# Patient Record
Sex: MALE | Race: WHITE | NOT HISPANIC OR LATINO | URBAN - METROPOLITAN AREA
[De-identification: names, ages, dates, MRNs, and addresses within clinical notes are randomized per-mention and may not be internally consistent; named-entity substitution may affect disease eponyms.]

---

## 2018-07-03 ENCOUNTER — EMERGENCY (EMERGENCY)
Facility: HOSPITAL | Age: 33
LOS: 1 days | Discharge: HOME | End: 2018-07-03
Attending: EMERGENCY MEDICINE
Payer: COMMERCIAL

## 2018-07-03 VITALS
SYSTOLIC BLOOD PRESSURE: 127 MMHG | HEART RATE: 72 BPM | OXYGEN SATURATION: 99 % | RESPIRATION RATE: 18 BRPM | DIASTOLIC BLOOD PRESSURE: 71 MMHG | TEMPERATURE: 98 F

## 2018-07-03 VITALS
SYSTOLIC BLOOD PRESSURE: 132 MMHG | DIASTOLIC BLOOD PRESSURE: 93 MMHG | RESPIRATION RATE: 18 BRPM | TEMPERATURE: 98 F | HEART RATE: 74 BPM

## 2018-07-03 PROCEDURE — 99282 EMERGENCY DEPT VISIT SF MDM: CPT

## 2018-07-03 PROCEDURE — 99283 EMERGENCY DEPT VISIT LOW MDM: CPT

## 2018-07-03 RX ORDER — CEPHALEXIN 500 MG
500 CAPSULE ORAL
Qty: 0 | Refills: 0 | Status: DISCONTINUED | OUTPATIENT
Start: 2018-07-03 | End: 2018-07-03

## 2018-07-03 RX ORDER — PHENAZOPYRIDINE HCL 100 MG
100 TABLET ORAL ONCE
Qty: 0 | Refills: 0 | Status: DISCONTINUED | OUTPATIENT
Start: 2018-07-03 | End: 2018-07-03

## 2018-07-03 NOTE — ED ADULT NURSE NOTE - OBJECTIVE STATEMENT
32 yo male pt presents to ed complaining of right arm numbness for two days. as per pt he woke up two days ago and noticed he had decreased strength to his right hand and wrist, as well as numbness on his right forearm. pt states he can't move his wrist due to weakness. pt denies any medical history. full rom in shoulder and elbow. pt's family states they are concerned because hw was slurring his speech this morning and there is a family history of aneurysm. family also states he was slurring his speech because he took his clonipin. pt denies tingling/headache/n/v/dizziness/blurry vision/fevers/chills/diaphoresis/chest pain/sob. cap refill brisk. radial pulses strong and equal bilaterally. skin warm dry and intact. pt denies falls/trauma.

## 2018-07-03 NOTE — ED PROVIDER NOTE - ATTENDING CONTRIBUTION TO CARE
pt is a 32 y/o male with r arm weakness and numbness with wrist drop noted for 2 days, with neuro intact prox with wrist drop noted, neuropraxia to radial n distrubution, outpt neuro follow up.

## 2018-07-03 NOTE — ED ADULT NURSE NOTE - CHPI ED SYMPTOMS NEG
no pain/no nausea/no fever/no decreased eating/drinking/no vomiting/no dizziness/no chills/no tingling

## 2018-07-03 NOTE — ED PROVIDER NOTE - PHYSICAL EXAMINATION
Weakness at wrist: unable to flex wrist.   Decreased  strength  Can move all fingers of right hand but decreased versus left hand   Decreased ability to supinate right forearm  Decreased sensation to right dorsal forearm, right thumb   Bicep/Tricep/Deltoid strength intact bilaterally  No focal cranial neuro deficits  Sensation intact to left arm  Small bruise to lateral aspect of right upper arm   no neck stiffness

## 2018-07-03 NOTE — ED PROVIDER NOTE - OBJECTIVE STATEMENT
33M presents with chief complaint of right arm weakness/numbness. Per patient, woke up Sunday morning and noticed that he had decreased strength to his right wrist/hand, as well as numbness to his dorsal forearm and thumb. Patient states that he cannot move his wrist (unable to flex wrist) due to weakness. No prior hx of similar issue. Of note, family states they are concerned b/c he was slurring speech this AM. Patient states that he took clonopin this AM and therefore was slurring speech. Denies any fevers or chills, nausea or vomiting, diarrhea or constipation, headache, dizziness, blurry vision. No weakness to any other extremity. No neck or back pain. Denies any drug use other than marijuana daily. Denies any recent heavy alcohol use. Smokes daily.

## 2020-07-14 ENCOUNTER — HOSPITAL ENCOUNTER (INPATIENT)
Dept: HOSPITAL 74 - YASAS | Age: 35
LOS: 5 days | Discharge: HOME | DRG: 773 | End: 2020-07-19
Attending: ALLERGY & IMMUNOLOGY | Admitting: ALLERGY & IMMUNOLOGY
Payer: COMMERCIAL

## 2020-07-14 VITALS — BODY MASS INDEX: 23.6 KG/M2

## 2020-07-14 DIAGNOSIS — F17.210: ICD-10-CM

## 2020-07-14 DIAGNOSIS — F19.24: ICD-10-CM

## 2020-07-14 DIAGNOSIS — F41.1: ICD-10-CM
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
## 2020-07-14 DIAGNOSIS — F12.20: ICD-10-CM

## 2020-07-14 DIAGNOSIS — F11.23: Primary | ICD-10-CM

## 2020-07-14 DIAGNOSIS — T40.1X1A: ICD-10-CM

## 2020-07-14 DIAGNOSIS — Z90.81: ICD-10-CM

## 2020-07-14 DIAGNOSIS — F19.280: ICD-10-CM

## 2020-07-14 DIAGNOSIS — F19.282: ICD-10-CM

## 2020-07-14 LAB
ALBUMIN SERPL-MCNC: 4.4 G/DL (ref 3.4–5)
ALP SERPL-CCNC: 52 U/L (ref 45–117)
ALT SERPL-CCNC: 18 U/L (ref 13–61)
ANION GAP SERPL CALC-SCNC: 9 MMOL/L (ref 8–16)
AST SERPL-CCNC: 9 U/L (ref 15–37)
BILIRUB SERPL-MCNC: 0.2 MG/DL (ref 0.2–1)
BUN SERPL-MCNC: 16.7 MG/DL (ref 7–18)
CALCIUM SERPL-MCNC: 9.2 MG/DL (ref 8.5–10.1)
CHLORIDE SERPL-SCNC: 102 MMOL/L (ref 98–107)
CO2 SERPL-SCNC: 28 MMOL/L (ref 21–32)
CREAT SERPL-MCNC: 1 MG/DL (ref 0.55–1.3)
DEPRECATED RDW RBC AUTO: 13.8 % (ref 11.9–15.9)
GLUCOSE SERPL-MCNC: 90 MG/DL (ref 74–106)
HCT VFR BLD CALC: 41.4 % (ref 35.4–49)
HGB BLD-MCNC: 14 GM/DL (ref 11.7–16.9)
MCH RBC QN AUTO: 31 PG (ref 25.7–33.7)
MCHC RBC AUTO-ENTMCNC: 33.9 G/DL (ref 32–35.9)
MCV RBC: 91.6 FL (ref 80–96)
PLATELET # BLD AUTO: 273 K/MM3 (ref 134–434)
PMV BLD: 8.5 FL (ref 7.5–11.1)
POTASSIUM SERPLBLD-SCNC: 3.3 MMOL/L (ref 3.5–5.1)
PROT SERPL-MCNC: 7.4 G/DL (ref 6.4–8.2)
RBC # BLD AUTO: 4.52 M/MM3 (ref 4–5.6)
SODIUM SERPL-SCNC: 138 MMOL/L (ref 136–145)
WBC # BLD AUTO: 6.5 K/MM3 (ref 4–10)

## 2020-07-14 PROCEDURE — HZ2ZZZZ DETOXIFICATION SERVICES FOR SUBSTANCE ABUSE TREATMENT: ICD-10-PCS | Performed by: ALLERGY & IMMUNOLOGY

## 2020-07-14 PROCEDURE — U0003 INFECTIOUS AGENT DETECTION BY NUCLEIC ACID (DNA OR RNA); SEVERE ACUTE RESPIRATORY SYNDROME CORONAVIRUS 2 (SARS-COV-2) (CORONAVIRUS DISEASE [COVID-19]), AMPLIFIED PROBE TECHNIQUE, MAKING USE OF HIGH THROUGHPUT TECHNOLOGIES AS DESCRIBED BY CMS-2020-01-R: HCPCS

## 2020-07-14 RX ADMIN — NICOTINE POLACRILEX PRN MG: 2 GUM, CHEWING ORAL at 18:09

## 2020-07-14 RX ADMIN — HYDROXYZINE PAMOATE SCH: 25 CAPSULE ORAL at 18:45

## 2020-07-14 RX ADMIN — Medication SCH MG: at 22:14

## 2020-07-14 RX ADMIN — SUVOREXANT PRN MG: 10 TABLET, FILM COATED ORAL at 22:14

## 2020-07-14 RX ADMIN — Medication SCH MG: at 22:13

## 2020-07-14 RX ADMIN — NICOTINE POLACRILEX PRN MG: 2 GUM, CHEWING ORAL at 21:06

## 2020-07-14 RX ADMIN — HYDROXYZINE PAMOATE SCH MG: 25 CAPSULE ORAL at 13:49

## 2020-07-14 RX ADMIN — HYDROXYZINE PAMOATE SCH MG: 25 CAPSULE ORAL at 22:14

## 2020-07-14 RX ADMIN — NICOTINE SCH: 21 PATCH TRANSDERMAL at 14:01
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
## 2020-07-14 NOTE — BHS.RME
Substance Use & Tx History





- Substance Use History


  ** Heroin


Substance amount:  5 bags


Frequency of use: Daily


Substance route: Inhalation (ex: sniffing or snorting)


Date of Last Use: 20 (4am)





  ** Marijuana/Hashish


Substance amount: 1 joint


Frequency of use: Less than 3 times per week


Substance route: Smoking


Date of Last Use: 07/10/20





  ** Nicotine


Substance amount: 1pack


Frequency of use: Daily


Substance route: Smoking


Date of Last Use: 20





Physical/Psych/Mental Status





- Behavior


General Behavior: Increased activity (restlessness, agitation)


Eye Contact: Normal





- Cooperativeness


Cooperativeness: Cooperative





- Thinking


Thought Processes: Tight, Logical, Goal Directed





- Physical Health Problems


Is patient presently having any pain?: No


Does patient presently have any injuries (include location): No


Does patient currently have a fever: No


Is patient pregnant: No





COWS





- Scale


Resting Pulse: 1= [place] 


Sweatin= No chills or Flushing


Restless Observation: 0= Sits Still


Pupil Size: 1= Pupils >than Normal


Bone or Joint Aches: 0= None


Runny Nose/ Eye Tearin= None


GI Upset > 30mins: 0= None


Tremor Observation: 0= None


Yawning Observation: 0= None


Anxiety or Irritability: 2=Irritable/Anxious


Goose Flesh Skin: 0=Smooth Skin


COWS Score: 4
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
## 2020-07-15 RX ADMIN — POTASSIUM CHLORIDE SCH MEQ: 1500 TABLET, EXTENDED RELEASE ORAL at 10:39

## 2020-07-15 RX ADMIN — NICOTINE POLACRILEX PRN MG: 2 GUM, CHEWING ORAL at 17:30

## 2020-07-15 RX ADMIN — HYDROXYZINE PAMOATE SCH MG: 25 CAPSULE ORAL at 10:39

## 2020-07-15 RX ADMIN — HYDROXYZINE PAMOATE SCH MG: 25 CAPSULE ORAL at 21:42

## 2020-07-15 RX ADMIN — NICOTINE SCH: 21 PATCH TRANSDERMAL at 10:40

## 2020-07-15 RX ADMIN — METHOCARBAMOL PRN MG: 500 TABLET ORAL at 14:36

## 2020-07-15 RX ADMIN — HYDROXYZINE PAMOATE SCH MG: 25 CAPSULE ORAL at 05:21

## 2020-07-15 RX ADMIN — HYDROXYZINE PAMOATE SCH MG: 25 CAPSULE ORAL at 17:29

## 2020-07-15 RX ADMIN — NICOTINE POLACRILEX PRN MG: 2 GUM, CHEWING ORAL at 21:46

## 2020-07-15 RX ADMIN — Medication SCH MG: at 21:42

## 2020-07-15 RX ADMIN — SUVOREXANT PRN MG: 10 TABLET, FILM COATED ORAL at 21:42

## 2020-07-15 RX ADMIN — Medication SCH TAB: at 10:40

## 2020-07-15 RX ADMIN — NICOTINE POLACRILEX PRN MG: 2 GUM, CHEWING ORAL at 10:44

## 2020-07-15 RX ADMIN — HYDROXYZINE PAMOATE SCH MG: 25 CAPSULE ORAL at 14:37

## 2020-07-15 RX ADMIN — NICOTINE POLACRILEX PRN MG: 2 GUM, CHEWING ORAL at 14:38

## 2020-07-15 NOTE — PN
BHS COWS





- Scale


Resting Pulse: 0= [place] 80 or Below


Sweatin= No chills or Flushing


Restless Observation: 1= Difficult to Sit Still


Pupil Size: 1= Pupils >than Normal


Bone or Joint Aches: 2= Severe Diffuse Aches


Runny Nose/ Eye Tearin= Nasal Congestion


GI Upset > 30mins: 2= Nausea/Diarrhea


Tremor Observation of Outstretched Hands: 2= Slight Tremor Visible


Yawning Observation: 1= 1-2x During Session


Anxiety or Irritability: 2=Irritable/Anxious


Goose Flesh Skin: 0=Smooth Skin


COWS Score: 12





BHS Progress Note (SOAP)


Subjective: 





alert,irritable,anxious,interrupted sleep,pain in the body and back,

tremor,nausea


Objective: 





07/15/20 13:09


                                   Vital Signs











Temperature  97.5 F L  07/15/20 08:55


 


Pulse Rate  73   07/15/20 08:55


 


Respiratory Rate  18   07/15/20 08:55


 


Blood Pressure  110/73   07/15/20 08:55


 


O2 Sat by Pulse Oximetry (%)  97   07/15/20 05:10











07/15/20 13:09


                             Laboratory Last Values











WBC  6.5 K/mm3 (4.0-10.0)   20  12:25    


 


RBC  4.52 M/mm3 (4.00-5.60)   20  12:25    


 


Hgb  14.0 GM/dL (11.7-16.9)   20  12:25    


 


Hct  41.4 % (35.4-49)   20  12:25    


 


MCV  91.6 fl (80-96)   20  12:25    


 


MCH  31.0 pg (25.7-33.7)   20  12:25    


 


MCHC  33.9 g/dl (32.0-35.9)   20  12:25    


 


RDW  13.8 % (11.9-15.9)   20  12:25    


 


Plt Count  273 K/MM3 (134-434)   20  12:25    


 


MPV  8.5 fl (7.5-11.1)   20  12:25    


 


Sodium  138 mmol/L (136-145)   20  12:25    


 


Potassium  3.3 mmol/L (3.5-5.1)  L  20  12:25    


 


Chloride  102 mmol/L ()   20  12:25    


 


Carbon Dioxide  28 mmol/L (21-32)   20  12:25    


 


Anion Gap  9 MMOL/L (8-16)   20  12:25    


 


BUN  16.7 mg/dL (7-18)   20  12:25    


 


Creatinine  1.0 mg/dL (0.55-1.3)   20  12:25    


 


Est GFR (CKD-EPI)AfAm  114.11   20  12:25    


 


Est GFR (CKD-EPI)NonAf  98.45   20  12:25    


 


Random Glucose  90 mg/dL ()   20  12:25    


 


Calcium  9.2 mg/dL (8.5-10.1)   20  12:25    


 


Total Bilirubin  0.2 mg/dL (0.2-1)   20  12:25    


 


AST  9 U/L (15-37)  L  20  12:25    


 


ALT  18 U/L (13-61)   20  12:25    


 


Alkaline Phosphatase  52 U/L ()   20  12:25    


 


Total Protein  7.4 g/dl (6.4-8.2)   20  12:25    


 


Albumin  4.4 g/dl (3.4-5.0)   20  12:25    


 


Syphilis Serology  Non-reactive  (NONREACTIVE)   20  12:25    











Assessment: 





07/15/20 13:10


withdrawal symptom


Plan: 





continue detox methadone regimen ,k 3.3 will give k dur replacement 20 meq po 

daily for 3 days,repeat k in am

## 2020-07-16 RX ADMIN — NICOTINE POLACRILEX PRN MG: 2 GUM, CHEWING ORAL at 19:26

## 2020-07-16 RX ADMIN — Medication SCH TAB: at 10:40

## 2020-07-16 RX ADMIN — Medication SCH MG: at 22:04

## 2020-07-16 RX ADMIN — METHOCARBAMOL PRN MG: 500 TABLET ORAL at 06:05

## 2020-07-16 RX ADMIN — SUVOREXANT PRN MG: 10 TABLET, FILM COATED ORAL at 22:04

## 2020-07-16 RX ADMIN — HYDROXYZINE PAMOATE PRN MG: 25 CAPSULE ORAL at 18:18

## 2020-07-16 RX ADMIN — NICOTINE POLACRILEX PRN MG: 2 GUM, CHEWING ORAL at 17:19

## 2020-07-16 RX ADMIN — POTASSIUM CHLORIDE SCH MEQ: 1500 TABLET, EXTENDED RELEASE ORAL at 10:40

## 2020-07-16 RX ADMIN — NICOTINE POLACRILEX PRN MG: 2 GUM, CHEWING ORAL at 12:25

## 2020-07-16 RX ADMIN — HYDROXYZINE PAMOATE PRN MG: 25 CAPSULE ORAL at 22:04

## 2020-07-16 RX ADMIN — NICOTINE SCH: 21 PATCH TRANSDERMAL at 10:40

## 2020-07-16 RX ADMIN — METHOCARBAMOL PRN MG: 500 TABLET ORAL at 12:24

## 2020-07-16 RX ADMIN — NICOTINE POLACRILEX PRN MG: 2 GUM, CHEWING ORAL at 22:32

## 2020-07-16 RX ADMIN — Medication SCH MG: at 22:03

## 2020-07-16 RX ADMIN — HYDROXYZINE PAMOATE SCH MG: 25 CAPSULE ORAL at 06:02

## 2020-07-16 RX ADMIN — NICOTINE POLACRILEX PRN MG: 2 GUM, CHEWING ORAL at 06:06

## 2020-07-16 RX ADMIN — HYDROXYZINE PAMOATE SCH MG: 25 CAPSULE ORAL at 10:40

## 2020-07-16 NOTE — PN
BHS COWS





- Scale


Resting Pulse: 0= [place] 80 or Below


Sweatin= No chills or Flushing


Restless Observation: 0= Sits Still


Pupil Size: 1= Pupils >than Normal


Bone or Joint Aches: 2= Severe Diffuse Aches


Runny Nose/ Eye Tearin= Nasal Congestion


GI Upset > 30mins: 2= Nausea/Diarrhea


Tremor Observation of Outstretched Hands: 2= Slight Tremor Visible


Yawning Observation: 1= 1-2x During Session


Anxiety or Irritability: 2=Irritable/Anxious


Goose Flesh Skin: 0=Smooth Skin


COWS Score: 11





BHS Progress Note (SOAP)


Subjective: 





alert,irritable,anxious,interrupted sleep,pain in the body,tremor


Objective: 





20 09:57


                                   Vital Signs











Temperature  97.5 F L  20 08:38


 


Pulse Rate  62   20 08:38


 


Respiratory Rate  17   20 08:38


 


Blood Pressure  107/71   20 08:38


 


O2 Sat by Pulse Oximetry (%)  98   20 05:17








                             Laboratory Last Values











WBC  6.5 K/mm3 (4.0-10.0)   20  12:25    


 


RBC  4.52 M/mm3 (4.00-5.60)   20  12:25    


 


Hgb  14.0 GM/dL (11.7-16.9)   20  12:25    


 


Hct  41.4 % (35.4-49)   20  12:25    


 


MCV  91.6 fl (80-96)   20  12:25    


 


MCH  31.0 pg (25.7-33.7)   20  12:25    


 


MCHC  33.9 g/dl (32.0-35.9)   20  12:25    


 


RDW  13.8 % (11.9-15.9)   20  12:25    


 


Plt Count  273 K/MM3 (134-434)   20  12:25    


 


MPV  8.5 fl (7.5-11.1)   20  12:25    


 


Sodium  138 mmol/L (136-145)   20  12:25    


 


Potassium  3.3 mmol/L (3.5-5.1)  L  20  12:25    


 


Chloride  102 mmol/L ()   20  12:25    


 


Carbon Dioxide  28 mmol/L (21-32)   20  12:25    


 


Anion Gap  9 MMOL/L (8-16)   20  12:25    


 


BUN  16.7 mg/dL (7-18)   20  12:25    


 


Creatinine  1.0 mg/dL (0.55-1.3)   20  12:25    


 


Est GFR (CKD-EPI)AfAm  114.11   20  12:25    


 


Est GFR (CKD-EPI)NonAf  98.45   20  12:25    


 


Random Glucose  90 mg/dL ()   20  12:25    


 


Calcium  9.2 mg/dL (8.5-10.1)   20  12:25    


 


Total Bilirubin  0.2 mg/dL (0.2-1)   20  12:25    


 


AST  9 U/L (15-37)  L  20  12:25    


 


ALT  18 U/L (13-61)   20  12:25    


 


Alkaline Phosphatase  52 U/L ()   20  12:25    


 


Total Protein  7.4 g/dl (6.4-8.2)   20  12:25    


 


Albumin  4.4 g/dl (3.4-5.0)   20  12:25    


 


Syphilis Serology  Non-reactive  (NONREACTIVE)   20  12:25    


 


COVID-19 ([facility])  Not detected  (Not Detected)   20  14:00    











Assessment: 





20 10:00


withdrawal symptom


Plan: 





continue detox methadone regimens,valium 10 mgs po q 4hrs prn for 72 hrs for 

severe withdrawal,fluid,on k replacement,repeat k pending

## 2020-07-17 RX ADMIN — NICOTINE POLACRILEX PRN MG: 2 GUM, CHEWING ORAL at 15:36

## 2020-07-17 RX ADMIN — HYDROXYZINE PAMOATE PRN MG: 25 CAPSULE ORAL at 21:23

## 2020-07-17 RX ADMIN — NICOTINE POLACRILEX PRN MG: 2 GUM, CHEWING ORAL at 13:45

## 2020-07-17 RX ADMIN — METHOCARBAMOL PRN MG: 500 TABLET ORAL at 20:03

## 2020-07-17 RX ADMIN — NICOTINE SCH: 21 PATCH TRANSDERMAL at 10:48

## 2020-07-17 RX ADMIN — Medication SCH MG: at 21:22

## 2020-07-17 RX ADMIN — NICOTINE POLACRILEX PRN MG: 2 GUM, CHEWING ORAL at 11:23

## 2020-07-17 RX ADMIN — POTASSIUM CHLORIDE SCH MEQ: 1500 TABLET, EXTENDED RELEASE ORAL at 10:48

## 2020-07-17 RX ADMIN — Medication SCH MG: at 21:23

## 2020-07-17 RX ADMIN — Medication SCH TAB: at 10:48

## 2020-07-17 RX ADMIN — HYDROXYZINE PAMOATE PRN MG: 25 CAPSULE ORAL at 17:12

## 2020-07-17 RX ADMIN — HYDROXYZINE PAMOATE PRN MG: 25 CAPSULE ORAL at 12:54

## 2020-07-17 RX ADMIN — NICOTINE POLACRILEX PRN MG: 2 GUM, CHEWING ORAL at 09:04

## 2020-07-17 RX ADMIN — NICOTINE POLACRILEX PRN MG: 2 GUM, CHEWING ORAL at 21:16

## 2020-07-17 RX ADMIN — NICOTINE POLACRILEX PRN MG: 2 GUM, CHEWING ORAL at 18:18

## 2020-07-17 RX ADMIN — METHOCARBAMOL PRN MG: 500 TABLET ORAL at 13:47

## 2020-07-17 RX ADMIN — SUVOREXANT PRN MG: 20 TABLET, FILM COATED ORAL at 21:23

## 2020-07-17 NOTE — PN
BHS Progress Note


Note: 





Patient reports sleeping poorly despite taking Belsomra 10 mg/hs. Told writer 

that he slept after taking the medication but woke up in the middle of the night

and could not go back to sleep. He requests to be ordered Ambien or Xanax. When 

told that these medications are not available in this facility, he settles on a 

higher dose of Belsomra

## 2020-07-17 NOTE — PN
BHS COWS





- Scale


Resting Pulse: 0= [place] 80 or Below


Sweatin= No chills or Flushing


Restless Observation: 0= Sits Still


Pupil Size: 1= Pupils >than Normal


Bone or Joint Aches: 1= Mild Discomfort


Runny Nose/ Eye Tearin= Nasal Congestion


GI Upset > 30mins: 1= Stomach Cramp


Tremor Observation of Outstretched Hands: 2= Slight Tremor Visible


Yawning Observation: 1= 1-2x During Session


Anxiety or Irritability: 2=Irritable/Anxious


Goose Flesh Skin: 0=Smooth Skin


COWS Score: 9





BHS Progress Note (SOAP)


Subjective: 





alert,irritable,anxious,interrupted sleep,pain in the body and back


Objective: 





20 10:41


                                   Vital Signs











Temperature  97.5 F L  20 08:45


 


Pulse Rate  73   20 08:45


 


Respiratory Rate  18   20 08:45


 


Blood Pressure  106/75   20 08:45


 


O2 Sat by Pulse Oximetry (%)  98   20 08:45











20 10:41


withdrawal symptom


Assessment: 





20 10:42


withdrawal symptom


Plan: 





continue detox methadone regimen,psychiatric reevaluation for insomnia

## 2020-07-18 RX ADMIN — METHOCARBAMOL PRN MG: 500 TABLET ORAL at 18:33

## 2020-07-18 RX ADMIN — NICOTINE POLACRILEX PRN MG: 2 GUM, CHEWING ORAL at 12:21

## 2020-07-18 RX ADMIN — NICOTINE POLACRILEX PRN MG: 2 GUM, CHEWING ORAL at 22:05

## 2020-07-18 RX ADMIN — Medication SCH TAB: at 10:01

## 2020-07-18 RX ADMIN — HYDROXYZINE PAMOATE PRN MG: 25 CAPSULE ORAL at 22:06

## 2020-07-18 RX ADMIN — METHOCARBAMOL PRN MG: 500 TABLET ORAL at 12:21

## 2020-07-18 RX ADMIN — Medication SCH MG: at 22:05

## 2020-07-18 RX ADMIN — HYDROXYZINE PAMOATE PRN MG: 25 CAPSULE ORAL at 18:06

## 2020-07-18 RX ADMIN — HYDROXYZINE PAMOATE PRN MG: 25 CAPSULE ORAL at 14:04

## 2020-07-18 RX ADMIN — NICOTINE POLACRILEX PRN MG: 2 GUM, CHEWING ORAL at 10:02

## 2020-07-18 RX ADMIN — NICOTINE POLACRILEX PRN MG: 2 GUM, CHEWING ORAL at 18:06

## 2020-07-18 RX ADMIN — Medication SCH MG: at 22:06

## 2020-07-18 RX ADMIN — HYDROXYZINE PAMOATE PRN MG: 25 CAPSULE ORAL at 10:04

## 2020-07-18 RX ADMIN — NICOTINE POLACRILEX PRN MG: 2 GUM, CHEWING ORAL at 15:35

## 2020-07-18 RX ADMIN — NICOTINE SCH: 21 PATCH TRANSDERMAL at 10:02

## 2020-07-18 RX ADMIN — SUVOREXANT PRN MG: 20 TABLET, FILM COATED ORAL at 22:06

## 2020-07-18 NOTE — PN
BHS COWS





- Scale


Resting Pulse: 0= [place] 80 or Below


Sweatin= No chills or Flushing


Restless Observation: 1= Difficult to Sit Still


Pupil Size: 0= Normal to Room Light


Bone or Joint Aches: 2= Severe Diffuse Aches


Runny Nose/ Eye Tearin= None


GI Upset > 30mins: 0= None


Tremor Observation of Outstretched Hands: 0= None


Yawning Observation: 0= None


Anxiety or Irritability: 2=Irritable/Anxious


Goose Flesh Skin: 0=Smooth Skin


COWS Score: 5





BHS Progress Note (SOAP)


Subjective: 





Complaints of sweats, body aches and anxiety.


Objective: 





20 11:26


                                   Vital Signs











  20





  05:03 08:45


 


Temperature 97.3 F L 98.4 F


 


Pulse Rate 62 63


 


Respiratory 16 16





Rate  


 


Blood Pressure 111/67 109/72


 


O2 Sat by Pulse 100 100





Oximetry (%)  








                             Laboratory Last Values











WBC  6.5 K/mm3 (4.0-10.0)   20  12:25    


 


RBC  4.52 M/mm3 (4.00-5.60)   20  12:25    


 


Hgb  14.0 GM/dL (11.7-16.9)   20  12:25    


 


Hct  41.4 % (35.4-49)   20  12:25    


 


MCV  91.6 fl (80-96)   20  12:25    


 


MCH  31.0 pg (25.7-33.7)   20  12:25    


 


MCHC  33.9 g/dl (32.0-35.9)   20  12:25    


 


RDW  13.8 % (11.9-15.9)   20  12:25    


 


Plt Count  273 K/MM3 (134-434)   20  12:25    


 


MPV  8.5 fl (7.5-11.1)   20  12:25    


 


Sodium  138 mmol/L (136-145)   20  12:25    


 


Potassium  4.2 mmol/L (3.5-5.1)   20  07:50    


 


Chloride  102 mmol/L ()   20  12:25    


 


Carbon Dioxide  28 mmol/L (21-32)   20  12:25    


 


Anion Gap  9 MMOL/L (8-16)   20  12:25    


 


BUN  16.7 mg/dL (7-18)   20  12:25    


 


Creatinine  1.0 mg/dL (0.55-1.3)   20  12:25    


 


Est GFR (CKD-EPI)AfAm  114.11   20  12:25    


 


Est GFR (CKD-EPI)NonAf  98.45   20  12:25    


 


Random Glucose  90 mg/dL ()   20  12:25    


 


Calcium  9.2 mg/dL (8.5-10.1)   20  12:25    


 


Total Bilirubin  0.2 mg/dL (0.2-1)   20  12:25    


 


AST  9 U/L (15-37)  L  20  12:25    


 


ALT  18 U/L (13-61)   20  12:25    


 


Alkaline Phosphatase  52 U/L ()   20  12:25    


 


Total Protein  7.4 g/dl (6.4-8.2)   20  12:25    


 


Albumin  4.4 g/dl (3.4-5.0)   20  12:25    


 


Syphilis Serology  Non-reactive  (NONREACTIVE)   20  12:25    


 


COVID-19 ([facility])  Not detected  (Not Detected)   20  14:00    








Labs noted.


Assessment: 





20 11:28


Alert and oriented x 3, in no acute respiratory distress.


Full ROM, ambulating in the unit without assistance.


Mild withdrawal symptoms.


D/c to [facility] in AM.


Plan: 





Continue detox protocol.


D/C to [facility] in AM.

## 2020-07-19 ENCOUNTER — HOSPITAL ENCOUNTER (EMERGENCY)
Dept: HOSPITAL 74 - JER | Age: 35
Discharge: HOME | End: 2020-07-19
Payer: COMMERCIAL

## 2020-07-19 VITALS — BODY MASS INDEX: 24.2 KG/M2

## 2020-07-19 VITALS — DIASTOLIC BLOOD PRESSURE: 88 MMHG | HEART RATE: 102 BPM | SYSTOLIC BLOOD PRESSURE: 123 MMHG

## 2020-07-19 VITALS — SYSTOLIC BLOOD PRESSURE: 128 MMHG | TEMPERATURE: 97.3 F | DIASTOLIC BLOOD PRESSURE: 67 MMHG | HEART RATE: 83 BPM

## 2020-07-19 VITALS — TEMPERATURE: 98.3 F

## 2020-07-19 DIAGNOSIS — T40.1X1A: Primary | ICD-10-CM

## 2020-07-19 DIAGNOSIS — F11.23: ICD-10-CM

## 2020-07-19 RX ADMIN — HYDROXYZINE PAMOATE PRN MG: 25 CAPSULE ORAL at 05:56

## 2020-07-19 RX ADMIN — METHOCARBAMOL PRN MG: 500 TABLET ORAL at 05:56

## 2020-07-19 RX ADMIN — NICOTINE POLACRILEX PRN MG: 2 GUM, CHEWING ORAL at 07:39

## 2020-07-19 NOTE — PDOC
History of Present Illness





- General


Chief Complaint: Overdose


Stated Complaint: OVERDOSE


Time Seen by Provider: 07/19/20 11:30


History Source: Patient


Exam Limitations: No Limitations





- History of Present Illness


Initial Comments: 





07/19/20 12:09


35y M hx of heroin abuse presents with complaint of opiate intoxication. The 

patient completed his detox for heroin today and was ready to be discharged, he 

was sitting outside and baught 10$ baggie of heroin and used it. He was found to

be unresponsive by staff at detox per EMS, Give 4intranasal and 4mg IM narcan 

with effective reversal. The pt currently feels fine. States he just has the 

sensation to have a BM. Denies any other sx including cp, sob, headache, 

palpitations, dizzyness, n/v, abd pain, sob.  Pt states he was clean for the 

past 6 days. He denies using anything else.


Dad is present and was planning on bringing him home today, then to rehab 

tomorrow.





Social: opiate abuse, occasional marijuana use











Past History





- Medical History


Allergies/Adverse Reactions: 


                                    Allergies











Allergy/AdvReac Type Severity Reaction Status Date / Time


 


No Known Drug Allergies Allergy   Verified 07/19/20 11:14











Home Medications: 


Ambulatory Orders





NK [No Known Home Medication]  07/14/20 








Asthma: No


Cardiac Disorders: No


COPD: No


Diabetes: No


GI Disorders: No


 Disorders: No


HTN: No


Kidney Stones: No


Seizures: No





- Surgical History


Abdominal Surgery: Yes (pt reports spleenectomy at end of 2015/2016 (pt unsure 

of date))


Appendectomy: No


Cardiac Surgery: No


Cholecystectomy: No


Lung Surgery: No


Neurologic Surgery: No


Orthopedic Surgery: No





- Reproductive History


Testicular Surgery: No





- Psycho-Social/Smoking History


Smoking History: Current every day smoker


Have you smoked in the past 12 months: Yes


Number of Cigarettes Smoked Daily: 20


Cigars Per Day: 0


Information on smoking cessation initiated: No


'Breaking Loose' booklet given: 07/14/20





- Substance Abuse Hx (Audit-C & DAST Scrn)


How often the patient has a drink containing alcohol: Never


Score: In Men: 4 or > Positive; In Women: 3 or > Positive: 0


Screen Result (Pos requires Nsg. Audit-10AR): Negative


In the last yr the pt used illegal drug/Rx for NonMed reason: Yes


Score:  Yes response is considered Positive: 1


Screen Result (Positive result requires Nsg. DAST-10): Positive





**Review of Systems





- Review of Systems


Able to Perform ROS?: Yes


Comments:: 





07/19/20 12:19


Constitutional - no reported Fever, Chills, 


HEENT: no reported vision changes, sore throat


Respiratory: no reported cough, sob, hemoptysis


Cardiac: no reported chest pain, palpitations, light headedness, leg swelling


Abd/GI: +diarrhea no reported abd pain, nausea, vomiting, blood per rectum, 

melena


: no reported dysuria, frequency, discharge


Musculskelatal - no reported back pain, joint swelling


skin - no reported bruising, erythema, rash


neurological: no reported headache, numbness, focal weakness, tingling, ataxia, 


hematologic: no reported  easy bruising, easy bleeding








*Physical Exam





- Vital Signs


                                Last Vital Signs











Temp Pulse Resp BP Pulse Ox


 


 98.3 F   101 H  16   121/90   99 


 


 07/19/20 11:31  07/19/20 11:31  07/19/20 11:31  07/19/20 11:31  07/19/20 11:31














- Physical Exam





07/19/20 12:21


GENERAL: The patient is awake, alert, and fully oriented, Nontoxic - in no acute

distress.


HEAD: Normocephalic, atraumatic.


EYES: extraocular movements intact, sclera anicteric, conjunctiva clear. pupils 

3mm symmetrically


ENT: Normal voice,  Moist mucous membranes.


NECK: Normal range of motion, supple


LUNGS: Breath sounds equal, clear to auscultation bilaterally.  No wheezes, no 

rhonchi, no rales.


HEART: Regular rate and rhythm, normal S1 and S2 without murmur, rub or gallop.


ABDOMEN: Soft, nontender, No guarding, no rebound.  No CVA tenderness


EXTREMITIES: Normal range of motion, no edema.  


NEUROLOGICAL: No facial assymetry, Normal speech, 


PSYCH: Normal mood, normal affect.


SKIN: Warm, Dry, normal turgor,








Medical Decision Making





- Medical Decision Making





07/19/20 12:22


35-year-old gentleman history of opiate abuse, status post 6 days of detox, took

1 baggy of heroin and was given IM and intranasal Narcan with, currently 

asymptomatic beside urgency to go to have a bowel movement.  





We will continue to observe the patient until 1:30 (half lief of narcan) -if the

patient is still at his normal baseline mental status will discharge patient 

into care of his father who plans on bringing him to rehab tomorrow. 











07/19/20 13:23


pt feeling well here.


no n/v


awake, alert


still feels [place] his narcan


will dc to care of his fater.


return precautions were discussed








I discussed the physical exam findings, ancillary test results and final 

diagnoses with the patient. I answered all of the patient's questions. The 

patient was satisfied with the care received and felt comfortable with the 

discharge plan and treatment plan. The patient will call their primary care 

physician within 24 hours to arrange follow-up and will return to the Emergency 

Department with any new, persistent or worsening symptoms. 








Discharge





- Discharge Information


Problems reviewed: Yes


Clinical Impression/Diagnosis: 


 Opioid dependence with withdrawal





Opioid overdose


Qualifiers:


 Encounter type: initial encounter Injury intent: accidental or unintentional 

Qualified Code(s): T40.2X1A - Poisoning by other opioids, accidental 

(unintentional), initial encounter





Condition: Improved


Disposition: HOME





- Admission


No





- Follow up/Referral


Referrals: 


[facility] [place] [Provider Group]





- Patient Discharge Instructions


Patient Printed Discharge Instructions:  DI for Opioid Addiction


Additional Instructions: 


Return to the emergency department immediately with ANY new, persistent or 

worsening symptoms.





You MUST call and follow up with your doctor tomorrow for further evaluation of 

your symptoms. Results were discussed with you. Please make sure your doctor re

views the results of your emergency evaluation. Your Emergency Department visit 

is not complete without a follow up with your doctor.





If you had any xrays during your visit, it was read preliminarily by myself, a 

Radiologist will review it and if there are any additional findings we will call

you. 


Print Language: ENGLISH





- Post Discharge Activity

## 2020-07-19 NOTE — DS
[facility] Detox Discharge Summary


Admission Date: 


07/14/20





Discharge Date: 07/19/20





- History


Present History: Cannabis Dependence, Opioid Dependence


Additional Comments: 





Patient completed detox successfully and discharged safely in stable condition. 

Rapid response team called outside in [facility] parking lot and it was for this 

patient who was unconscious after OD on heroin. He received Narcan intranasal x2

and IM once then regained consciousness. He told the rapid response team that he

went to the [facility] and he returned to [facility] awaiting for his father to pick him up 

as he is scheduled for inpatient rehab tomorrow at another site. Patient said he

took 1 bag of heroin but when EMS arrived he told the EMT that he took 2 bags of

heroin. Patient initially resisted transfer to ER but stated he will go.


Pertinent Past History: 





Nicotine dependence





Opioid dependence





- Physical Exam Results


Vital Signs: 


                                   Vital Signs











Temperature  97.3 F L  07/19/20 05:23


 


Pulse Rate  83   07/19/20 05:23


 


Respiratory Rate  18   07/19/20 05:23


 


Blood Pressure  128/67   07/19/20 05:23


 


O2 Sat by Pulse Oximetry (%)  98   07/19/20 05:23











Pertinent Admission Physical Exam Findings: 





Withdrawal sxs





                                Laboratory Tests











  07/14/20 07/14/20 07/14/20





  12:25 12:25 12:25


 


WBC  6.5  


 


RBC  4.52  


 


Hgb  14.0  


 


Hct  41.4  


 


MCV  91.6  


 


MCH  31.0  


 


MCHC  33.9  


 


RDW  13.8  


 


Plt Count  273  


 


MPV  8.5  


 


Sodium   138 


 


Potassium   3.3 L 


 


Chloride   102 


 


Carbon Dioxide   28 


 


Anion Gap   9 


 


BUN   16.7 


 


Creatinine   1.0 


 


Est GFR (CKD-EPI)AfAm   114.11 


 


Est GFR (CKD-EPI)NonAf   98.45 


 


Random Glucose   90 


 


Calcium   9.2 


 


Total Bilirubin   0.2 


 


AST   9 L 


 


ALT   18 


 


Alkaline Phosphatase   52 


 


Total Protein   7.4 


 


Albumin   4.4 


 


Syphilis Serology    Non-reactive


 


COVID-19 ([facility])   














  07/14/20 07/16/20





  14:00 07:50


 


WBC  


 


RBC  


 


Hgb  


 


Hct  


 


MCV  


 


MCH  


 


MCHC  


 


RDW  


 


Plt Count  


 


MPV  


 


Sodium  


 


Potassium   4.2


 


Chloride  


 


Carbon Dioxide  


 


Anion Gap  


 


BUN  


 


Creatinine  


 


Est GFR (CKD-EPI)AfAm  


 


Est GFR (CKD-EPI)NonAf  


 


Random Glucose  


 


Calcium  


 


Total Bilirubin  


 


AST  


 


ALT  


 


Alkaline Phosphatase  


 


Total Protein  


 


Albumin  


 


Syphilis Serology  


 


COVID-19 ([facility])  Not detected 








Labs reviewed





- Treatment


Hospital Course: Detox Protocol Followed, Detoxed Safely, Responded well, 

Discharged Condition Good





- Medication


Discharge Medications: 


Ambulatory Orders





NK [No Known Home Medication]  07/14/20 











- Diagnosis


(1) Cannabis dependence


Status: Acute   





(2) Opioid dependence with withdrawal


Status: Acute   





(3) Opioid overdose


Status: Acute   


Qualifiers: 


   Encounter type: initial encounter   Injury intent: accidental or 

unintentional   Qualified Code(s): T40.2X1A - Poisoning by other opioids, 

accidental (unintentional), initial encounter   





(4) Nicotine dependence


Status: Chronic   





- AMA


Did Patient Leave Against Medical Advice: No (Instructed to follow up with PCP 

within 1 week)

## 2022-09-09 NOTE — ED PROVIDER NOTE - CHIEF COMPLAINT
The INR is stable and therapeutic. Continue Coumadin 2.5mg daily except 5mg on Mon-Wed-Fri.    Recheck INR in 5 weeks The patient is a 33y Male complaining of numbness.